# Patient Record
Sex: MALE | Race: WHITE | NOT HISPANIC OR LATINO | Employment: OTHER | URBAN - METROPOLITAN AREA
[De-identification: names, ages, dates, MRNs, and addresses within clinical notes are randomized per-mention and may not be internally consistent; named-entity substitution may affect disease eponyms.]

---

## 2023-11-01 ENCOUNTER — APPOINTMENT (OUTPATIENT)
Dept: RADIOLOGY | Facility: IMAGING CENTER | Age: 28
End: 2023-11-01
Attending: PHYSICIAN ASSISTANT
Payer: MEDICARE

## 2023-11-01 ENCOUNTER — OFFICE VISIT (OUTPATIENT)
Dept: URGENT CARE | Facility: CLINIC | Age: 28
End: 2023-11-01
Payer: MEDICARE

## 2023-11-01 VITALS
SYSTOLIC BLOOD PRESSURE: 126 MMHG | RESPIRATION RATE: 15 BRPM | DIASTOLIC BLOOD PRESSURE: 80 MMHG | WEIGHT: 267 LBS | OXYGEN SATURATION: 98 % | HEART RATE: 88 BPM | HEIGHT: 70 IN | BODY MASS INDEX: 38.22 KG/M2 | TEMPERATURE: 97.8 F

## 2023-11-01 DIAGNOSIS — M25.531 RIGHT WRIST PAIN: ICD-10-CM

## 2023-11-01 PROCEDURE — 99203 OFFICE O/P NEW LOW 30 MIN: CPT | Performed by: PHYSICIAN ASSISTANT

## 2023-11-01 PROCEDURE — 73110 X-RAY EXAM OF WRIST: CPT | Mod: TC,RT | Performed by: PHYSICIAN ASSISTANT

## 2023-11-01 PROCEDURE — 3079F DIAST BP 80-89 MM HG: CPT | Performed by: PHYSICIAN ASSISTANT

## 2023-11-01 PROCEDURE — 3074F SYST BP LT 130 MM HG: CPT | Performed by: PHYSICIAN ASSISTANT

## 2023-11-01 RX ORDER — ESTRADIOL 2 MG/1
TABLET ORAL
COMMUNITY
Start: 2023-10-17

## 2023-11-01 RX ORDER — SPIRONOLACTONE 100 MG/1
TABLET, FILM COATED ORAL
COMMUNITY
Start: 2023-10-17

## 2023-11-01 ASSESSMENT — ENCOUNTER SYMPTOMS
MYALGIAS: 1
FEVER: 0
FALLS: 1

## 2023-11-01 NOTE — PROGRESS NOTES
"Subjective:     CHIEF COMPLAINT  Chief Complaint   Patient presents with    Wrist Pain       HPI  Ros Dean is a very pleasant 28 y.o. male who presents to the clinic after potentially injuring his right wrist.  Patient states he fell twice over the last couple weeks landing on his right wrist.  States he has minimal sensation over the right hand and wrist due to a dog bite he sustained multiple years ago.  For this reason and he is unaware if he may have \"messed up his wrist\".  Continues to have full range of motion.  No bruising or swelling.  No OTC medications have been started.  He has been wearing a wrist brace.    REVIEW OF SYSTEMS  Review of Systems   Constitutional:  Negative for fever.   Musculoskeletal:  Positive for falls, joint pain and myalgias.   Skin:  Negative for rash.       PAST MEDICAL HISTORY  There are no problems to display for this patient.      SURGICAL HISTORY  patient denies any surgical history    ALLERGIES  Allergies   Allergen Reactions    Hydroxyzine Hives     Pt states he gets confussion       CURRENT MEDICATIONS  Home Medications       Reviewed by Saman Thurston P.A.-C. (Physician Assistant) on 11/01/23 at 1157  Med List Status: <None>     Medication Last Dose Status   estradiol (ESTRACE) 2 MG Tab Taking Active   spironolactone (ALDACTONE) 100 MG Tab Taking Active                    SOCIAL HISTORY  Social History     Tobacco Use    Smoking status: Never    Smokeless tobacco: Never   Vaping Use    Vaping Use: Never used   Substance and Sexual Activity    Alcohol use: Not Currently    Drug use: Never    Sexual activity: Not on file       FAMILY HISTORY  History reviewed. No pertinent family history.       Objective:     VITAL SIGNS: /80 (BP Location: Right arm, Patient Position: Sitting, BP Cuff Size: Adult long)   Pulse 88   Temp 36.6 °C (97.8 °F) (Temporal)   Resp 15   Ht 1.778 m (5' 10\")   Wt 121 kg (267 lb)   SpO2 98%   BMI 38.31 kg/m²     PHYSICAL EXAM  Physical " Exam  Constitutional:       Appearance: Normal appearance.   HENT:      Head: Normocephalic and atraumatic.   Pulmonary:      Effort: Pulmonary effort is normal.   Musculoskeletal:      Cervical back: Normal range of motion.      Comments: Right wrist: No obvious deformity, discoloration or edema present.  No bony tenderness to palpation.  Full wrist flexion, extension, ulnar and radial deviation.   strength 4/5.   Neurological:      General: No focal deficit present.      Mental Status: He is alert and oriented to person, place, and time. Mental status is at baseline.       RADIOLOGY RESULTS   DX-WRIST-COMPLETE 3+ RIGHT    Result Date: 11/1/2023 11/1/2023 12:03 PM HISTORY/REASON FOR EXAM:  Pain/Deformity Following Trauma TECHNIQUE/EXAM DESCRIPTION AND NUMBER OF VIEWS: 3 of the right wrist COMPARISON: None. FINDINGS: There is normal bony mineralization.  There is no evidence of fracture, dislocation, or osseous lesion.  There is no evidence of soft tissue injury.     1.  No radiographic evidence of acute injury.         Assessment/Plan:     1. Right wrist pain  DX-WRIST-COMPLETE 3+ RIGHT          MDM/Comments:    X-rays reviewed without any fracture or bony abnormality.  Maintains full and pain-free range of motion of the wrist.  Advise discontinuing wrist brace and gradually increasing activity.  Tylenol/ibuprofen as discussed.    Differential diagnosis, natural history, supportive care, and indications for immediate follow-up discussed. All questions answered. Patient agrees with the plan of care.    Follow-up as needed if symptoms worsen or fail to improve to PCP, Urgent care or Emergency Room.    I have personally reviewed prior external notes and test results pertinent to today's visit.  I have independently reviewed and interpreted all diagnostics ordered during this urgent care acute visit.   Discussed management options (risks,benefits, and alternatives to treatment). Pt expresses understanding and the  treatment plan was agreed upon. Questions were encouraged and answered to pt's satisfaction.    Please note that this dictation was created using voice recognition software. I have made a reasonable attempt to correct obvious errors, but I expect that there are errors of grammar and possibly content that I did not discover before finalizing the note.